# Patient Record
Sex: FEMALE | Employment: FULL TIME | URBAN - METROPOLITAN AREA
[De-identification: names, ages, dates, MRNs, and addresses within clinical notes are randomized per-mention and may not be internally consistent; named-entity substitution may affect disease eponyms.]

---

## 2023-06-28 PROBLEM — Z00.00 HEALTHCARE MAINTENANCE: Status: ACTIVE | Noted: 2023-06-28

## 2023-06-28 NOTE — PROGRESS NOTES
"Subjective   Patient ID: Caitlin Alcaraz is a 53 y.o. female who presents for Annual Exam (She is not fasting for blood work today.  Sees GYN for exams).    HPI     54 yo female presents for a physical   last seen for physical 12/2020    sees gyn for exams/mammos dr pérez   2/2023 mammo-neg     9/2022 colonoscopy   DEEPA jiménez  hx of crohns- not on any meds. controlled.      immunizations:   tetanus- unsure of -defers  shingrix-not yet  flu shot-defers  Covid shots-had     BMI 28   Did keto and lost some wt but gained some back    tries to eat healthy and stay active   Hx HLD 12/2020 lipids were elevated     works at a special ed  in [place]     sees optanibal  sees her dentist for regular cleanings      She denies any chest pains, palpitations, edema, shortness of breath, abdominal pains, reflux, nausea, vomiting, diarrhea, constipation, blood in stool, melena.      Denies any mole changes.  Plans to see derm -bort for a skin check        Review of Systems  ROS as stated in HPI    Objective   /84   Pulse 84   Temp 36.8 °C (98.3 °F)   Ht 1.651 m (5' 5\")   Wt 77.8 kg (171 lb 9.6 oz)   BMI 28.56 kg/m²     Physical Exam  Constitutional: A&O; NAD  HEENT: conjunctiva normal. EOMI; PERRLA; lids normal; TM's clear;   Neck: supple; No lymphadenopathy   Heart: RRR     Lungs: CTA bilateral   Abd: Soft, Nontender, Nondistended  Ext:  No edema;    Neuro: No gross neuro deficits    Psych: Judgment, orientation, mood, affect, insight wnl   Assessment/Plan   Problem List Items Addressed This Visit          Gastrointestinal and Abdominal    Crohn's disease (CMS/HCC)       Health Encounters    Healthcare maintenance - Primary    Relevant Orders    CBC    Comprehensive Metabolic Panel    Hemoglobin A1C    Lipid Panel    TSH with reflex to Free T4 if abnormal    Vitamin D, Total     Other Visit Diagnoses       Urine frequency        Relevant Orders    POCT UA Automated manually resulted (Completed)    Urine Culture "        sees gyn for exams/mammos  2/2023 mammo-neg  unsure of last tetanus- defers booster today  shingrix recommended  defers flu shot   Had covid shots  healthy lifestyle-diet, exercise                     .   check labs   9/2022 colonoscopy  Check UA/culture for urgency; had a mesh yrs ago

## 2023-06-29 ENCOUNTER — OFFICE VISIT (OUTPATIENT)
Dept: PRIMARY CARE | Facility: CLINIC | Age: 53
End: 2023-06-29
Payer: COMMERCIAL

## 2023-06-29 VITALS
BODY MASS INDEX: 28.59 KG/M2 | DIASTOLIC BLOOD PRESSURE: 84 MMHG | TEMPERATURE: 98.3 F | HEART RATE: 84 BPM | HEIGHT: 65 IN | WEIGHT: 171.6 LBS | SYSTOLIC BLOOD PRESSURE: 130 MMHG

## 2023-06-29 DIAGNOSIS — K50.919 CROHN'S DISEASE WITH COMPLICATION, UNSPECIFIED GASTROINTESTINAL TRACT LOCATION (MULTI): ICD-10-CM

## 2023-06-29 DIAGNOSIS — R35.0 URINE FREQUENCY: ICD-10-CM

## 2023-06-29 DIAGNOSIS — Z00.00 HEALTHCARE MAINTENANCE: Primary | ICD-10-CM

## 2023-06-29 PROBLEM — E78.5 HYPERLIPIDEMIA: Status: ACTIVE | Noted: 2023-06-29

## 2023-06-29 PROBLEM — F41.9 ANXIETY: Status: ACTIVE | Noted: 2023-06-29

## 2023-06-29 PROBLEM — K50.90 CROHN'S DISEASE (MULTI): Status: ACTIVE | Noted: 2023-06-29

## 2023-06-29 LAB
POC APPEARANCE, URINE: CLEAR
POC BILIRUBIN, URINE: NEGATIVE
POC BLOOD, URINE: NEGATIVE
POC COLOR, URINE: YELLOW
POC GLUCOSE, URINE: NEGATIVE MG/DL
POC KETONES, URINE: NEGATIVE MG/DL
POC LEUKOCYTES, URINE: NEGATIVE
POC NITRITE,URINE: NEGATIVE
POC PH, URINE: 7 PH
POC PROTEIN, URINE: NEGATIVE MG/DL
POC SPECIFIC GRAVITY, URINE: 1.01
POC UROBILINOGEN, URINE: 0.2 EU/DL

## 2023-06-29 PROCEDURE — 99396 PREV VISIT EST AGE 40-64: CPT | Performed by: FAMILY MEDICINE

## 2023-06-29 PROCEDURE — 81003 URINALYSIS AUTO W/O SCOPE: CPT | Performed by: FAMILY MEDICINE

## 2023-06-29 PROCEDURE — 87086 URINE CULTURE/COLONY COUNT: CPT

## 2023-06-29 PROCEDURE — 1036F TOBACCO NON-USER: CPT | Performed by: FAMILY MEDICINE

## 2023-06-29 ASSESSMENT — PATIENT HEALTH QUESTIONNAIRE - PHQ9
2. FEELING DOWN, DEPRESSED OR HOPELESS: NOT AT ALL
SUM OF ALL RESPONSES TO PHQ9 QUESTIONS 1 AND 2: 0
1. LITTLE INTEREST OR PLEASURE IN DOING THINGS: NOT AT ALL

## 2023-06-30 LAB — URINE CULTURE: NORMAL

## 2023-07-07 ENCOUNTER — TELEPHONE (OUTPATIENT)
Dept: PRIMARY CARE | Facility: CLINIC | Age: 53
End: 2023-07-07
Payer: COMMERCIAL

## 2023-07-07 NOTE — TELEPHONE ENCOUNTER
----- Message from Ludivina Elliott DO sent at 6/30/2023  4:55 PM EDT -----  Let pt know her urinalysis was normal but her  urine culture showed a mixture of bacteria, indicating contamination; if still having symptoms, I would like them to come in and give another urine sample-clean catch to have a repeat culture done.

## 2023-07-07 NOTE — TELEPHONE ENCOUNTER
Informed patient.  She will see how she feels and come in to give another sample if she feels she needs to.

## 2023-07-19 ENCOUNTER — APPOINTMENT (OUTPATIENT)
Dept: PRIMARY CARE | Facility: CLINIC | Age: 53
End: 2023-07-19
Payer: COMMERCIAL

## 2023-07-19 ENCOUNTER — CLINICAL SUPPORT (OUTPATIENT)
Dept: PRIMARY CARE | Facility: CLINIC | Age: 53
End: 2023-07-19
Payer: COMMERCIAL

## 2023-07-19 DIAGNOSIS — R35.0 URINE FREQUENCY: ICD-10-CM

## 2023-07-19 LAB
NON-UH HIE A/G RATIO: 1.4
NON-UH HIE ALB: 4.3 G/DL (ref 3.4–5)
NON-UH HIE ALK PHOS: 80 UNIT/L (ref 46–116)
NON-UH HIE BILIRUBIN, TOTAL: 0.6 MG/DL (ref 0.2–1)
NON-UH HIE BUN/CREAT RATIO: 25.7
NON-UH HIE BUN: 18 MG/DL (ref 9–23)
NON-UH HIE CALCIUM: 9.6 MG/DL (ref 8.7–10.4)
NON-UH HIE CALCULATED LDL CHOLESTEROL: 136 MG/DL (ref 60–130)
NON-UH HIE CALCULATED OSMOLALITY: 281 MOSM/KG (ref 275–295)
NON-UH HIE CHLORIDE: 104 MMOL/L (ref 98–107)
NON-UH HIE CHOLESTEROL: 240 MG/DL (ref 100–200)
NON-UH HIE CO2, VENOUS: 28 MMOL/L (ref 20–31)
NON-UH HIE CREATININE: 0.7 MG/DL (ref 0.5–0.8)
NON-UH HIE GFR AA: >60
NON-UH HIE GLOBULIN: 3 G/DL
NON-UH HIE GLOMERULAR FILTRATION RATE: >60 ML/MIN/1.73M?
NON-UH HIE GLUCOSE: 88 MG/DL (ref 74–106)
NON-UH HIE GOT: 19 UNIT/L (ref 15–37)
NON-UH HIE GPT: 13 UNIT/L (ref 10–49)
NON-UH HIE HCT: 37.7 % (ref 36–46)
NON-UH HIE HDL CHOLESTEROL: 83 MG/DL (ref 40–60)
NON-UH HIE HGB A1C: 4.7 %
NON-UH HIE HGB: 12.5 G/DL (ref 12–16)
NON-UH HIE INSTR WBC ND: 7.1
NON-UH HIE K: 3.8 MMOL/L (ref 3.5–5.1)
NON-UH HIE MCH: 28.3 PG (ref 27–34)
NON-UH HIE MCHC: 33.2 G/DL (ref 32–37)
NON-UH HIE MCV: 85.5 FL (ref 80–100)
NON-UH HIE MPV: 7.8 FL (ref 7.4–10.4)
NON-UH HIE NA: 140 MMOL/L (ref 135–145)
NON-UH HIE PLATELET: 215 X10 (ref 150–450)
NON-UH HIE RBC: 4.42 X10 (ref 4.2–5.4)
NON-UH HIE RDW: 14.2 % (ref 11.5–14.5)
NON-UH HIE TOTAL CHOL/HDL CHOL RATIO: 2.9
NON-UH HIE TOTAL PROTEIN: 7.3 G/DL (ref 5.7–8.2)
NON-UH HIE TRIGLYCERIDES: 103 MG/DL (ref 30–150)
NON-UH HIE TSH: 1.88 UIU/ML (ref 0.55–4.78)
NON-UH HIE VIT D 25: 18 NG/ML
NON-UH HIE WBC: 7.1 X10 (ref 4.5–11)

## 2023-07-19 PROCEDURE — 87086 URINE CULTURE/COLONY COUNT: CPT

## 2023-07-20 LAB — URINE CULTURE: NORMAL

## 2023-07-21 ENCOUNTER — TELEPHONE (OUTPATIENT)
Dept: PRIMARY CARE | Facility: CLINIC | Age: 53
End: 2023-07-21
Payer: COMMERCIAL

## 2023-07-21 DIAGNOSIS — E55.9 VITAMIN D DEFICIENCY: ICD-10-CM

## 2023-07-21 NOTE — TELEPHONE ENCOUNTER
----- Message from Ludivina Elliott DO sent at 7/20/2023  1:37 PM EDT -----  Please let pt know their urine culture was negative for an infection.

## 2023-07-21 NOTE — TELEPHONE ENCOUNTER
----- Message from Ludivina Elliott DO sent at 7/20/2023  1:37 PM EDT -----  Urine cx was neg  ----- Message -----  From: Kymberly Chiu CMA  Sent: 7/20/2023  12:08 PM EDT  To: Ludivina Elliott DO    She stopped in yesterday and gave a urine sample for culture.  ----- Message -----  From: Ludivina Elliott DO  Sent: 7/19/2023   5:08 PM EDT  To: Kymberly Chiu CMA    Please let pt know that her blood counts, sugar, electrolytes, thyroid, liver, and kidney function are all normal. Her cholesterol was elevated. I recommend a healthy diet and exercise and we will cont to monitor  Her vitamin D level is low at 18(should be above 30).  I recommend she take a higher dose prescription strength vitamin D once a week for 12 weeks and then we can recheck.  Please enter rx for vitamin D 50, 000 units once a week #12 no refills dx: vitamin D deficiency

## 2023-07-23 RX ORDER — ERGOCALCIFEROL 1.25 MG/1
50000 CAPSULE ORAL
Qty: 12 CAPSULE | Refills: 0 | Status: SHIPPED
Start: 2023-07-23 | End: 2023-10-12 | Stop reason: ALTCHOICE

## 2023-10-12 ENCOUNTER — OFFICE VISIT (OUTPATIENT)
Dept: PRIMARY CARE | Facility: CLINIC | Age: 53
End: 2023-10-12
Payer: COMMERCIAL

## 2023-10-12 VITALS
HEIGHT: 65 IN | HEART RATE: 105 BPM | TEMPERATURE: 97.3 F | WEIGHT: 177.8 LBS | DIASTOLIC BLOOD PRESSURE: 83 MMHG | SYSTOLIC BLOOD PRESSURE: 128 MMHG | BODY MASS INDEX: 29.62 KG/M2

## 2023-10-12 DIAGNOSIS — J02.9 PHARYNGITIS, UNSPECIFIED ETIOLOGY: Primary | ICD-10-CM

## 2023-10-12 DIAGNOSIS — J02.9 SORE THROAT: ICD-10-CM

## 2023-10-12 LAB
POC RAPID MONO: NEGATIVE
POC RAPID STREP: NEGATIVE

## 2023-10-12 PROCEDURE — 86308 HETEROPHILE ANTIBODY SCREEN: CPT | Performed by: FAMILY MEDICINE

## 2023-10-12 PROCEDURE — 1036F TOBACCO NON-USER: CPT | Performed by: FAMILY MEDICINE

## 2023-10-12 PROCEDURE — 99213 OFFICE O/P EST LOW 20 MIN: CPT | Performed by: FAMILY MEDICINE

## 2023-10-12 PROCEDURE — 87880 STREP A ASSAY W/OPTIC: CPT | Performed by: FAMILY MEDICINE

## 2023-10-12 RX ORDER — AZITHROMYCIN 250 MG/1
TABLET, FILM COATED ORAL
Qty: 6 TABLET | Refills: 0 | Status: SHIPPED | OUTPATIENT
Start: 2023-10-12 | End: 2023-10-17

## 2023-10-12 NOTE — PROGRESS NOTES
"Subjective   Patient ID: Caitlin Alcaraz is a 53 y.o. female who presents for Sore Throat (Sore throat and ear pain since Sunday.  Took covid test that was negative, was seen at [facility] clinic and tested for strep but it was negative.  Sees White spots on the left side of throat. ).    HPI     54 yo female presents co sore throat x 4 days  Went to [facility] clinic on Tues 10/10 RST-neg and covid-neg    Co exudate on left tonsil and pain on that side and left ear  No fevers or chills  +fatigue and achey      Review of Systems  ROS as stated in HPI    Objective   /83   Pulse 105   Temp 36.3 °C (97.3 °F)   Ht 1.651 m (5' 5\")   Wt 80.6 kg (177 lb 12.8 oz)   BMI 29.59 kg/m²     Physical Exam  Constitutional: A&O; NAD  HEENT: conjunctiva normal. EOMI; PERRLA; lids normal; TM's clear; left tonsil with exudate  Neck: supple; No lymphadenopathy   Heart: RRR     Lungs: CTA bilateral   Abd: Soft, Nontender, Nondistended  Ext:  No edema;    Neuro: No gross neuro deficits     Psych: Judgment, orientation, mood, affect, insight wnl   Assessment/Plan   Problem List Items Addressed This Visit    None  Visit Diagnoses         Codes    Pharyngitis, unspecified etiology    -  Primary J02.9    Relevant Medications    azithromycin (Zithromax) 250 mg tablet    Sore throat     J02.9    Relevant Orders    POCT Rapid Strep A manually resulted (Completed)    POCT Infectious Mononucleosis Antibody manually resulted (Completed)              Check RST- neg  Check mono-neg  Rx zpak  Supportive tx  Fu if persists or worsens     "

## 2023-12-19 ENCOUNTER — OFFICE VISIT (OUTPATIENT)
Dept: PRIMARY CARE | Facility: CLINIC | Age: 53
End: 2023-12-19
Payer: COMMERCIAL

## 2023-12-19 VITALS
HEIGHT: 65 IN | SYSTOLIC BLOOD PRESSURE: 125 MMHG | HEART RATE: 100 BPM | OXYGEN SATURATION: 95 % | WEIGHT: 183.6 LBS | TEMPERATURE: 97.1 F | BODY MASS INDEX: 30.59 KG/M2 | DIASTOLIC BLOOD PRESSURE: 81 MMHG

## 2023-12-19 DIAGNOSIS — B35.3 TINEA PEDIS OF BOTH FEET: ICD-10-CM

## 2023-12-19 DIAGNOSIS — M19.072 PRIMARY OSTEOARTHRITIS OF LEFT FOOT: Primary | ICD-10-CM

## 2023-12-19 DIAGNOSIS — M79.672 LEFT FOOT PAIN: ICD-10-CM

## 2023-12-19 RX ORDER — CLOTRIMAZOLE AND BETAMETHASONE DIPROPIONATE 10; .64 MG/G; MG/G
1 CREAM TOPICAL 2 TIMES DAILY
Qty: 30 G | Refills: 0 | Status: SHIPPED | OUTPATIENT
Start: 2023-12-19 | End: 2024-02-17

## 2023-12-19 ASSESSMENT — PATIENT HEALTH QUESTIONNAIRE - PHQ9
SUM OF ALL RESPONSES TO PHQ9 QUESTIONS 1 AND 2: 0
1. LITTLE INTEREST OR PLEASURE IN DOING THINGS: NOT AT ALL
2. FEELING DOWN, DEPRESSED OR HOPELESS: NOT AT ALL

## 2023-12-19 NOTE — PROGRESS NOTES
"Subjective   Patient ID: Caitlin Alcaraz is a 53 y.o. female who presents for Foot Injury (Left foot pain).  3 week hx of foot pain  No injury   Cortisone shot today  No injury that she can recall  Painful to walk on the ball of her foot  First heelstrike in the morning uncomfortable  3 weeks getting worse  Also has some tinea a little itchy in between her toes has had on and off for several months        Review of Systems  Constitutional: no chills, no fever and no night sweats.   Eyes: no blurred vision and no eyesight problems.   ENT: no hearing loss, no nasal congestion, no nasal discharge, no hoarseness and no sore throat.   Cardiovascular: no chest pain, no intermittent leg claudication, no lower extremity edema, no palpitations and no syncope.   Respiratory: no cough, no shortness of breath during exertion, no shortness of breath at rest and no wheezing.   Gastrointestinal: no abdominal pain, no blood in stools, no constipation, no diarrhea, no melena, no nausea, no rectal pain and no vomiting.   Genitourinary: no dysuria, no change in urinary frequency, no urinary hesitancy, no feelings of urinary urgency and no vaginal discharge.   Musculoskeletal: no arthralgias,  no back pain and no myalgias.   Integumentary: no new skin lesions and no rashes.   Neurological: no difficulty walking, no headache, no limb weakness, no numbness and no tingling.   Psychiatric: no anxiety, no depression, no anhedonia and no substance use disorders.   Endocrine: no recent weight gain and no recent weight loss.   Hematologic/Lymphatic: no tendency for easy bruising and no swollen glands .    Objective    /81   Pulse 100   Temp 36.2 °C (97.1 °F)   Ht 1.651 m (5' 5\")   Wt 83.3 kg (183 lb 9.6 oz)   SpO2 95%   BMI 30.55 kg/m²    Physical Exam  The patient appeared well nourished and normally developed. Vital signs as documented. Head exam is unremarkable. No scleral icterus or corneal arcus noted.  Pupils are equal " round reactive to light extraocular movements are intact no hemorrhages noted on funduscopic exam mouth mucous membranes are moist no exudates ears canals clear TMs are gray pearly not injected nose no rhinorrhea or epistaxis Neck is without jugular venous distension, thyromegaly, or carotid bruits. Carotid upstrokes are brisk bilaterally. Lungs are clear to auscultation and percussion. Cardiac exam reveals the PMI to be normally sized and situated. Rhythm is regular. First and second heart sounds normal. No murmurs, rubs or gallops. Abdominal exam reveals normal bowel sounds, no masses, no organomegaly and no aortic enlargement. Extremities are nonedematous and both femoral and pedal pulses are normal.  Neurologic exam DTRs are equal bilaterally no focal deficits strength is symmetrical heme lymph no palpable lymph nodes in the neck axilla or groin  Pain at the base of the metatarsals and PIP consistent with osteoarthritisPatient ID: Caitlin Alcaraz is a 53 y.o. female.    Joint Injection Small/Arthrocentesis: L intertarsal on 1/1/2024 2:34 AM  Indications: pain  Details: 25 G needle, plantar approach  Medications: 10 mg triamcinolone acetonide 40 mg/mL    10 mg Kenalog injected plantar foot patient tolerated well        Assessment/Plan   Problem List Items Addressed This Visit       Left foot pain    Relevant Medications    triamcinolone acetonide (Kenalog-40) injection 10 mg (Start on 1/1/2024  2:45 AM)    Other Relevant Orders    XR foot left 1-2 views    Tinea pedis of both feet    Relevant Medications    clotrimazole-betamethasone (Lotrisone) cream    Primary osteoarthritis of left foot - Primary     10 mg Kenalog injected posteriorly  Patient tolerated well  Follow-up if symptoms do not improve  Presumptive diagnosis osteoarthritis                 Massiel Small MD

## 2023-12-24 NOTE — RESULT ENCOUNTER NOTE
Please call patient and let her know x-ray of her foot shows mild arthritis  We can try a cortisone shot if she is not better

## 2023-12-28 ENCOUNTER — TELEPHONE (OUTPATIENT)
Dept: PRIMARY CARE | Facility: CLINIC | Age: 53
End: 2023-12-28
Payer: COMMERCIAL

## 2023-12-28 NOTE — TELEPHONE ENCOUNTER
----- Message from Massiel Small MD sent at 12/23/2023 10:24 PM EST -----  Please call patient and let her know x-ray of her foot shows mild arthritis  We can try a cortisone shot if she is not better

## 2024-01-01 PROBLEM — M79.672 LEFT FOOT PAIN: Status: ACTIVE | Noted: 2024-01-01

## 2024-01-01 PROBLEM — B35.3 TINEA PEDIS OF BOTH FEET: Status: ACTIVE | Noted: 2024-01-01

## 2024-01-01 PROBLEM — M19.072 PRIMARY OSTEOARTHRITIS OF LEFT FOOT: Status: ACTIVE | Noted: 2024-01-01

## 2024-01-01 RX ORDER — TRIAMCINOLONE ACETONIDE 40 MG/ML
10 INJECTION, SUSPENSION INTRA-ARTICULAR; INTRAMUSCULAR
Status: COMPLETED | OUTPATIENT
Start: 2024-01-01 | End: 2024-01-01

## 2024-01-01 RX ORDER — TRIAMCINOLONE ACETONIDE 40 MG/ML
10 INJECTION, SUSPENSION INTRA-ARTICULAR; INTRAMUSCULAR ONCE
Status: DISCONTINUED | OUTPATIENT
Start: 2024-01-01 | End: 2024-03-19 | Stop reason: ALTCHOICE

## 2024-01-01 RX ADMIN — TRIAMCINOLONE ACETONIDE 10 MG: 40 INJECTION, SUSPENSION INTRA-ARTICULAR; INTRAMUSCULAR at 02:34

## 2024-01-01 NOTE — ASSESSMENT & PLAN NOTE
10 mg Kenalog injected posteriorly  Patient tolerated well  Follow-up if symptoms do not improve  Presumptive diagnosis osteoarthritis

## 2024-03-17 NOTE — PROGRESS NOTES
"Subjective   Patient ID: Caitlin Alcaraz is a 53 y.o. female who presents for Hypertension (190/110 a few days ago in the morning.  Palpitations. ).    HPI   54 yo female presents co episodes of racing heart on and off since Thursday.  Woke her up.  Feels her heart is racing.  Had her daughter checked her blood pressure and it was elevated.  Also has a nurse at work and has been checking and his also been consistently elevated.  No history of high blood pressure in the past.  Denies any shortness of breath but has had some chest discomfort with the racing.  Denies any recent stress or anxiety.  Does not drink any caffeine.  Drinks decaf coffee.  No recent decongestants.  Has not been sick recently.  Occasional snoring but no obvious apnea.  BP has been up to 190/110.  Does not have a watch that monitors HR  Not sure if her HR was actually elevated during these episodes.        last seen for physical 6/2023  Last labs 7/2023 9/2022 colonoscopy   DEEPA jiménez  hx of crohns- not on any meds.    some blood recently but associated with hemorrhoids.  No abdominal pain.       She denies any chest pains, palpitations, edema, shortness of breath, abdominal pains, reflux, nausea, vomiting, diarrhea, constipation, blood in stool, melena.         Review of Systems  ROS as stated in HPI    Objective   /80   Pulse 87   Temp 36.8 °C (98.3 °F)   Ht 1.651 m (5' 5\")   Wt 82.1 kg (181 lb)   BMI 30.12 kg/m²     Physical Exam  Constitutional: A&O; NAD  HEENT: conjunctiva normal. EOMI; PERRLA; lids normal; TM's clear;   Neck: supple; No lymphadenopathy   Heart: RRR     Lungs: CTA bilateral   Abd: Soft, Nontender, Nondistended  Ext:  No edema;    Neuro: No gross neuro deficits     Psych: Judgment, orientation, mood, affect, insight wnl   Assessment/Plan   Problem List Items Addressed This Visit             ICD-10-CM    Healthcare maintenance Z00.00    Relevant Orders    CBC (Completed)    Comprehensive Metabolic Panel " (Completed)    Hemoglobin A1C (Completed)    TSH with reflex to Free T4 if abnormal (Completed)     Other Visit Diagnoses         Codes    Atypical chest pain    -  Primary R07.89    Relevant Orders    Echocardiogram Stress Test    Palpitations     R00.2    Relevant Orders    ECG 12 lead (Clinic Performed)    Echocardiogram Stress Test    Holter or Event Cardiac Monitor    Elevated BP without diagnosis of hypertension     R03.0            Check EKG Sinus HR 76; some nonspecific changes;   Check labs today  Check stress echo and holter  Monitor BP/HR  9/2022 colonoscopy; fu with GI re crohns/bleeding  Go to ER if any

## 2024-03-19 ENCOUNTER — LAB (OUTPATIENT)
Dept: LAB | Facility: LAB | Age: 54
End: 2024-03-19
Payer: COMMERCIAL

## 2024-03-19 ENCOUNTER — OFFICE VISIT (OUTPATIENT)
Dept: PRIMARY CARE | Facility: CLINIC | Age: 54
End: 2024-03-19
Payer: COMMERCIAL

## 2024-03-19 VITALS
BODY MASS INDEX: 30.16 KG/M2 | HEIGHT: 65 IN | WEIGHT: 181 LBS | HEART RATE: 87 BPM | SYSTOLIC BLOOD PRESSURE: 143 MMHG | DIASTOLIC BLOOD PRESSURE: 80 MMHG | TEMPERATURE: 98.3 F

## 2024-03-19 DIAGNOSIS — R07.89 ATYPICAL CHEST PAIN: Primary | ICD-10-CM

## 2024-03-19 DIAGNOSIS — K50.919 CROHN'S DISEASE WITH COMPLICATION, UNSPECIFIED GASTROINTESTINAL TRACT LOCATION (MULTI): ICD-10-CM

## 2024-03-19 DIAGNOSIS — R00.2 PALPITATIONS: ICD-10-CM

## 2024-03-19 DIAGNOSIS — Z00.00 HEALTHCARE MAINTENANCE: ICD-10-CM

## 2024-03-19 DIAGNOSIS — R03.0 ELEVATED BP WITHOUT DIAGNOSIS OF HYPERTENSION: ICD-10-CM

## 2024-03-19 PROCEDURE — 83036 HEMOGLOBIN GLYCOSYLATED A1C: CPT

## 2024-03-19 PROCEDURE — 1036F TOBACCO NON-USER: CPT | Performed by: FAMILY MEDICINE

## 2024-03-19 PROCEDURE — 99214 OFFICE O/P EST MOD 30 MIN: CPT | Performed by: FAMILY MEDICINE

## 2024-03-19 PROCEDURE — 80053 COMPREHEN METABOLIC PANEL: CPT

## 2024-03-19 PROCEDURE — 93000 ELECTROCARDIOGRAM COMPLETE: CPT | Performed by: FAMILY MEDICINE

## 2024-03-19 PROCEDURE — 36415 COLL VENOUS BLD VENIPUNCTURE: CPT

## 2024-03-19 PROCEDURE — 85027 COMPLETE CBC AUTOMATED: CPT

## 2024-03-19 PROCEDURE — 84443 ASSAY THYROID STIM HORMONE: CPT

## 2024-03-19 ASSESSMENT — PATIENT HEALTH QUESTIONNAIRE - PHQ9
1. LITTLE INTEREST OR PLEASURE IN DOING THINGS: NOT AT ALL
2. FEELING DOWN, DEPRESSED OR HOPELESS: NOT AT ALL
SUM OF ALL RESPONSES TO PHQ9 QUESTIONS 1 AND 2: 0

## 2024-03-20 LAB
ALBUMIN SERPL BCP-MCNC: 4.5 G/DL (ref 3.4–5)
ALP SERPL-CCNC: 60 U/L (ref 33–110)
ALT SERPL W P-5'-P-CCNC: 9 U/L (ref 7–45)
ANION GAP SERPL CALC-SCNC: 13 MMOL/L (ref 10–20)
AST SERPL W P-5'-P-CCNC: 14 U/L (ref 9–39)
BILIRUB SERPL-MCNC: 0.5 MG/DL (ref 0–1.2)
BUN SERPL-MCNC: 9 MG/DL (ref 6–23)
CALCIUM SERPL-MCNC: 9.5 MG/DL (ref 8.6–10.6)
CHLORIDE SERPL-SCNC: 101 MMOL/L (ref 98–107)
CO2 SERPL-SCNC: 29 MMOL/L (ref 21–32)
CREAT SERPL-MCNC: 0.57 MG/DL (ref 0.5–1.05)
EGFRCR SERPLBLD CKD-EPI 2021: >90 ML/MIN/1.73M*2
ERYTHROCYTE [DISTWIDTH] IN BLOOD BY AUTOMATED COUNT: 13.3 % (ref 11.5–14.5)
EST. AVERAGE GLUCOSE BLD GHB EST-MCNC: 100 MG/DL
GLUCOSE SERPL-MCNC: 90 MG/DL (ref 74–99)
HBA1C MFR BLD: 5.1 %
HCT VFR BLD AUTO: 38.1 % (ref 36–46)
HGB BLD-MCNC: 12.4 G/DL (ref 12–16)
MCH RBC QN AUTO: 27.8 PG (ref 26–34)
MCHC RBC AUTO-ENTMCNC: 32.5 G/DL (ref 32–36)
MCV RBC AUTO: 85 FL (ref 80–100)
NRBC BLD-RTO: 0 /100 WBCS (ref 0–0)
PLATELET # BLD AUTO: 207 X10*3/UL (ref 150–450)
POTASSIUM SERPL-SCNC: 3.8 MMOL/L (ref 3.5–5.3)
PROT SERPL-MCNC: 7.2 G/DL (ref 6.4–8.2)
RBC # BLD AUTO: 4.46 X10*6/UL (ref 4–5.2)
SODIUM SERPL-SCNC: 139 MMOL/L (ref 136–145)
TSH SERPL-ACNC: 0.97 MIU/L (ref 0.44–3.98)
WBC # BLD AUTO: 8.1 X10*3/UL (ref 4.4–11.3)

## 2024-03-21 ENCOUNTER — TELEPHONE (OUTPATIENT)
Dept: PRIMARY CARE | Facility: CLINIC | Age: 54
End: 2024-03-21
Payer: COMMERCIAL

## 2024-03-21 NOTE — TELEPHONE ENCOUNTER
Read patient her blood results.  She is curious about her iron or if she is anemic.  I think there is a separate iron test that could look at that.

## 2024-03-21 NOTE — TELEPHONE ENCOUNTER
----- Message from Ludivina Elliott, DO sent at 3/20/2024  9:40 AM EDT -----  Please let pt know that her blood counts, sugar, electrolytes, thyroid, liver, and kidney function are all normal.

## 2024-04-24 ENCOUNTER — APPOINTMENT (OUTPATIENT)
Dept: CARDIOLOGY | Facility: CLINIC | Age: 54
End: 2024-04-24
Payer: COMMERCIAL

## 2024-05-13 ENCOUNTER — TELEPHONE (OUTPATIENT)
Dept: PRIMARY CARE | Facility: CLINIC | Age: 54
End: 2024-05-13

## 2024-05-28 ENCOUNTER — TELEPHONE (OUTPATIENT)
Dept: PRIMARY CARE | Facility: CLINIC | Age: 54
End: 2024-05-28
Payer: COMMERCIAL

## 2024-05-28 NOTE — TELEPHONE ENCOUNTER
----- Message from Ludivina Elliott DO sent at 5/23/2024  8:52 AM EDT -----  Let pt know her holter showed normal sinus rhythm with very rare premature beats.  Apologize for the delay in getting back to her as I just received the report.  Has she had the stress echo that I ordered?      ----- Message -----  From: Kymberly Chiu CMA  Sent: 5/23/2024   8:25 AM EDT  To: Ludivina Elliott DO

## 2024-05-28 NOTE — TELEPHONE ENCOUNTER
Informed patient.  She did not have the Echo as she wanted to do the Holter first.  She hasn't had any palpitations since her visit.  She will schedule the Echo if the palpitations return.

## 2025-08-11 ENCOUNTER — APPOINTMENT (OUTPATIENT)
Dept: PRIMARY CARE | Facility: CLINIC | Age: 55
End: 2025-08-11
Payer: COMMERCIAL